# Patient Record
Sex: FEMALE | Race: BLACK OR AFRICAN AMERICAN | NOT HISPANIC OR LATINO | URBAN - METROPOLITAN AREA
[De-identification: names, ages, dates, MRNs, and addresses within clinical notes are randomized per-mention and may not be internally consistent; named-entity substitution may affect disease eponyms.]

---

## 2017-08-14 ENCOUNTER — HISTORICAL (OUTPATIENT)
Dept: ADMINISTRATIVE | Facility: HOSPITAL | Age: 67
End: 2017-08-14

## 2017-12-13 ENCOUNTER — HISTORICAL (OUTPATIENT)
Dept: RADIOLOGY | Facility: HOSPITAL | Age: 67
End: 2017-12-13

## 2019-04-11 ENCOUNTER — HISTORICAL (OUTPATIENT)
Dept: ADMINISTRATIVE | Facility: HOSPITAL | Age: 69
End: 2019-04-11

## 2019-04-11 LAB
ABS NEUT (OLG): 6.11 X10(3)/MCL (ref 2.1–9.2)
BASOPHILS # BLD AUTO: 0.04 X10(3)/MCL (ref 0–0.2)
BASOPHILS NFR BLD AUTO: 0.5 % (ref 0–1)
BUN SERPL-MCNC: 23 MG/DL (ref 7–18)
CALCIUM SERPL-MCNC: 9.3 MG/DL (ref 8.5–10.1)
CHLORIDE SERPL-SCNC: 107 MMOL/L (ref 98–107)
CO2 SERPL-SCNC: 27 MMOL/L (ref 21–32)
CREAT SERPL-MCNC: 1.05 MG/DL (ref 0.55–1.02)
CREAT/UREA NIT SERPL: 22
EOSINOPHIL # BLD AUTO: 0.05 X10(3)/MCL (ref 0–0.9)
EOSINOPHIL NFR BLD AUTO: 0.6 % (ref 0–6.4)
ERYTHROCYTE [DISTWIDTH] IN BLOOD BY AUTOMATED COUNT: 16.7 % (ref 11.5–17)
GLUCOSE SERPL-MCNC: 90 MG/DL (ref 74–106)
HCT VFR BLD AUTO: 36.5 % (ref 37–47)
HGB BLD-MCNC: 11.5 GM/DL (ref 12–16)
IMM GRANULOCYTES # BLD AUTO: 0.04 10*3/UL (ref 0–0.02)
IMM GRANULOCYTES NFR BLD AUTO: 0.5 % (ref 0–0.43)
LYMPHOCYTES # BLD AUTO: 1.4 X10(3)/MCL (ref 0.6–4.6)
LYMPHOCYTES NFR BLD AUTO: 17.1 % (ref 16–44)
MCH RBC QN AUTO: 28 PG (ref 27–31)
MCHC RBC AUTO-ENTMCNC: 31.5 GM/DL (ref 33–36)
MCV RBC AUTO: 89 FL (ref 80–94)
MONOCYTES # BLD AUTO: 0.56 X10(3)/MCL (ref 0.1–1.3)
MONOCYTES NFR BLD AUTO: 6.8 % (ref 4–12.1)
NEUTROPHILS # BLD AUTO: 6.11 X10(3)/MCL (ref 2.1–9.2)
NEUTROPHILS NFR BLD AUTO: 74.5 % (ref 43–73)
NRBC BLD AUTO-RTO: 0 % (ref 0–0.2)
PLATELET # BLD AUTO: 444 X10(3)/MCL (ref 130–400)
PMV BLD AUTO: 9.1 FL (ref 7.4–10.4)
POTASSIUM SERPL-SCNC: 4.2 MMOL/L (ref 3.5–5.1)
RBC # BLD AUTO: 4.1 X10(6)/MCL (ref 4.2–5.4)
SODIUM SERPL-SCNC: 141 MMOL/L (ref 136–145)
WBC # SPEC AUTO: 8.2 X10(3)/MCL (ref 4.5–11.5)

## 2019-04-16 ENCOUNTER — HOSPITAL ENCOUNTER (OUTPATIENT)
Dept: ORTHOPEDICS | Facility: HOSPITAL | Age: 69
End: 2019-04-17
Attending: ORTHOPAEDIC SURGERY | Admitting: ORTHOPAEDIC SURGERY

## 2019-10-08 ENCOUNTER — HISTORICAL (OUTPATIENT)
Dept: ADMINISTRATIVE | Facility: HOSPITAL | Age: 69
End: 2019-10-08

## 2019-10-31 ENCOUNTER — HISTORICAL (OUTPATIENT)
Dept: ADMINISTRATIVE | Facility: HOSPITAL | Age: 69
End: 2019-10-31

## 2019-10-31 LAB
ABS NEUT (OLG): 7.97 X10(3)/MCL (ref 2.1–9.2)
ALBUMIN SERPL-MCNC: 2.7 GM/DL (ref 3.4–5)
ALBUMIN/GLOB SERPL: 0.5 RATIO (ref 1–2)
ALP SERPL-CCNC: 40 UNIT/L (ref 45–117)
ALT SERPL-CCNC: 10 UNIT/L (ref 13–56)
AST SERPL-CCNC: 14 UNIT/L (ref 15–37)
BILIRUB SERPL-MCNC: 0.2 MG/DL (ref 0.2–1)
BILIRUBIN DIRECT+TOT PNL SERPL-MCNC: <0.1 MG/DL (ref 0–0.2)
BILIRUBIN DIRECT+TOT PNL SERPL-MCNC: >0.1 MG/DL (ref 0–1)
BUN SERPL-MCNC: 27 MG/DL (ref 7–18)
CALCIUM SERPL-MCNC: 9.7 MG/DL (ref 8.5–10.1)
CHLORIDE SERPL-SCNC: 107 MMOL/L (ref 98–107)
CO2 SERPL-SCNC: 27 MMOL/L (ref 21–32)
CREAT SERPL-MCNC: 1.01 MG/DL (ref 0.55–1.02)
ERYTHROCYTE [DISTWIDTH] IN BLOOD BY AUTOMATED COUNT: 15.8 % (ref 11.5–17)
GLOBULIN SER-MCNC: 5 GM/DL (ref 2–4)
GLUCOSE SERPL-MCNC: 99 MG/DL (ref 74–106)
HCT VFR BLD AUTO: 30.7 % (ref 37–47)
HGB BLD-MCNC: 9.6 GM/DL (ref 12–16)
MCH RBC QN AUTO: 26.3 PG (ref 27–31)
MCHC RBC AUTO-ENTMCNC: 31.3 GM/DL (ref 33–36)
MCV RBC AUTO: 84.1 FL (ref 80–94)
MRSA SCREEN BY PCR: NEGATIVE
NRBC BLD AUTO-RTO: 0 % (ref 0–0.2)
PLATELET # BLD AUTO: 675 X10(3)/MCL (ref 130–400)
PMV BLD AUTO: 8.5 FL (ref 7.4–10.4)
POTASSIUM SERPL-SCNC: 5.1 MMOL/L (ref 3.5–5.1)
PROT SERPL-MCNC: 7.7 GM/DL (ref 6.4–8.2)
RBC # BLD AUTO: 3.65 X10(6)/MCL (ref 4.2–5.4)
SODIUM SERPL-SCNC: 138 MMOL/L (ref 136–145)
WBC # SPEC AUTO: 10.6 X10(3)/MCL (ref 4.5–11.5)

## 2019-11-18 ENCOUNTER — HISTORICAL (OUTPATIENT)
Dept: SURGERY | Facility: HOSPITAL | Age: 69
End: 2019-11-18

## 2022-04-01 ENCOUNTER — HISTORICAL (OUTPATIENT)
Dept: HEPATOLOGY | Facility: HOSPITAL | Age: 72
End: 2022-04-01

## 2022-04-10 ENCOUNTER — HISTORICAL (OUTPATIENT)
Dept: ADMINISTRATIVE | Facility: HOSPITAL | Age: 72
End: 2022-04-10

## 2022-04-26 VITALS
WEIGHT: 171.06 LBS | DIASTOLIC BLOOD PRESSURE: 67 MMHG | BODY MASS INDEX: 26.85 KG/M2 | HEIGHT: 67 IN | SYSTOLIC BLOOD PRESSURE: 113 MMHG

## 2022-04-30 NOTE — OP NOTE
DATE OF SURGERY:    04/16/2019    SURGEON:  Dmitry Gray MD    SERVICE:  Orthopedics.    PREOPERATIVE DIAGNOSIS:  Locked and unreducible right middle finger, trigger finger.    POSTOPERATIVE DIAGNOSIS:  Locked and unreducible right middle finger, trigger finger.    PROCEDURE:  Trigger finger release, right middle finger.    ANESTHESIA:  IV sedation.    IV FLUIDS:  As per Anesthesia.    ESTIMATED BLOOD LOSS:  Less than 5 cc.    COUNTS:  Correct.    COMPLICATIONS:  None.    DISPOSITION:  Stable to PACU.    INDICATIONS FOR PROCEDURE:  Ms. Pina is a 68-year-old female who has been followed in my clinic.  The patient had complaints of a locked middle finger for the last 3 weeks.  She states it was popping and now she was not able to unlock it.  This has been going on for the last few weeks.  She does have a history of trigger finger.  She has failed conservative treatment.  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  She has a history of rheumatoid arthritis.  The risks, benefits and alternatives were discussed with the patient in detail.  The risks included, but were not limited to, pain, bleeding, infection, damage to blood vessels or nerves, heart attack, stroke, death, need for operation, continued pain, continued loss of motion, need for additional surgery, scar tissue.  The patient understood these risks and wanted to proceed with surgical intervention.  Informed consent was obtained.    PROCEDURE IN DETAIL:  The patient was found in preoperative holding by Anesthesia and found fit for surgery. She was taken to the operating room and placed on the operating room table in supine position. All bony prominences were well padded.  Timeout was called to identify correct patient, correct procedure and correct site, and all were in agreement.  The patient underwent IV sedation.  She was then prepped and draped in normal sterile fashion leaving the right upper extremity  exposed for surgery. Well-padded tourniquet was placed on the right upper arm.  Approximate tourniquet time was 5 minutes at 275.  She received her preoperative antibiotics.  After exsanguination of the right upper extremity, tourniquet was inflated. Attention was turned to the right middle finger where it was completely in the palm of her hand.  She had a complete flexed MCP joint.  She also had a rigid deformity, boutonniere defomrity with hyperextension of the DIP joint.  Next a 1.5 cm incision made over the A1 pulley of the right middle finger.  Soft tissue dissection was carried down where the A1 pulley was then incised.  A large amount of fluid was also released at this time.  Copious amounts of scar tissue was also released off the flexor tendon both proximally and distally.  After this was done, the patient had near full extension of her MCP joint.  She remained to have a rigid boutonniere deformity though she was now able to have flexion and extension of her MCP joint.  Her finger was unlocked.  There was no additional triggering or catching appreciated.  Tourniquet was released, hemostasis achieved, copious irrigation was used to wash the wound.  Incision was closed with 3-0 nylon suture in standard fashion.  Xeroform, 4 x 4's, soft tissue dressing and a well-padded volar splint was placed on the right hand and wrist.  She was then awoken by Anesthesia and brought to PACU in stable condition.        ______________________________  MD BUZZ Hutson/  DD:  04/16/2019  Time:  08:35PM  DT:  04/16/2019  Time:  08:58PM  Job #:  [id number]

## 2022-05-03 NOTE — HISTORICAL [facility] CERNER
This is a historical note converted from [facility]. Formatting and pictures may have been removed.  Please reference [facility] for original formatting and attached multimedia. Chief Complaint  8 WEEK FOLLOW UP LEFT HUMERUS SHAFT FX, S/0 ORIF  History of Present Illness  Pt now 9 weeks s/p left humerus shaft ORIF. She is here today for x-rays/evaluation. She is doing well in regard to left arm, but she c/o severe pain to both knees. She sees Dr. Stoddard (rheumatology) for severe RA. She states the knee pain prevents her from being able to walk with PT. She states the pain is so severe that she screams in agony when she is made to stand up with PT. She is requesting that we write an order that she should not have to stand up with PT.  Review of Systems  ????Constitutional: Negative.  ????Eye: Negative.  ????Ear/Nose/Mouth/Throat: Negative.  ????Respiratory: Negative.  ????Cardiovascular: Negative.  ????Gastrointestinal: Negative.  ????Genitourinary: Negative.  ????Hematology/Lymphatics: Negative except HPI.  ????Endocrine: Negative.  ????Immunologic: Negative except HPI.  ????Musculoskeletal: Negative except HPI.  ????Integumentary: Negative.  ????Neurologic: Negative.  ????Psychiatric: Negative.  ????All other systems are negative  Physical Exam  Vitals & Measurements  HR:?85?(Peripheral)? BP:?95/78? HT:?170?cm? HT:?170?cm? WT:?96?kg? WT:?96?kg? BMI:?33.22?  ????General-Alert, oriented, no fever, chills  ????HEENT-Normocephalic, EOMI, moist oral mucosa, neck supple and nontender  ????Respiratory-Nonlabored respirations, symmetric chest expansion, chest wall nontender  ????Cardiac-Regular rate and rhythm, Pulses palpable in all extremities, Normal peripheral perfusion  ????Gastrointestinal-Soft, nontender, nondistended  ????Hemo/Lymph-No lymphadenopathy  ????Tftnudbifstlziy-QNV-mdolvjgn well healed. ROM?elbow full?flexion, lacking 20 extension. Good?pronation/supination. NVID.  BLE-knees with extreme stiffness and pain on  attempted ROM, unable to straighten knees.  ????Neurologic-Alert and oriented x4, cooperative  ????Dermatologic-Skin warm, pink, dry.  Assessment/Plan  Closed displaced spiral fracture of shaft of left humerus  ?  [facility] She is now at baseline for ROM left elbow due to her hx RA. She can RTC 2 months for repeat x-ray/evaluation. We will internally refer her to our partner Dr. Drew to discuss knee replacement surgeries due to her severe knee arthritis. Continue PT to [facility].  ?  Ordered:  Clinic Follow up, *Est. 10/14/17 3:00:00 CDT, Order for future visit, Closed displaced spiral fracture of shaft of left humerus, [facility]  Post-Op follow-up visit [id number] PC, Closed displaced spiral fracture of shaft of left humerus, [facility], 08/14/17 15:36:00 CDT  XR Humerus Left 2 Views, Routine, *Est. 10/14/17 3:00:00 CDT, Follow Up Trauma, None, Ambulatory, Rad Type, Order for future visit, Closed displaced spiral fracture of shaft of left humerus, Not Scheduled, 2, month(s), In Approximately, *Est. 10/14/17 3:00:00 CDT  ?   Problem List/Past Medical History  Closed displaced spiral fracture of shaft of left humerus  Obesity  Historical  chronic back pain  depression  frequent H/A  high cholesterol  HTN  lumbar fracture  Right Ankle fracture  ulcer dx  visual disorder  Procedure/Surgical History  Transfusion of Nonautologous Red Blood Cells into Peripheral Vein, Percutaneous Approach (06/12/2017), ORIF Humerus Shaft (Left) (06/08/2017), Reposition Left Humeral Shaft with Internal Fixation Device, Open Approach (06/08/2017), Other endoscopy of small intestine (06/29/2013), Transfusion of packed cells (06/29/2013), lumbar fusion, Right Ankle fracture repair, Right Ovary removal.  Medications  CARAFATE 1 GM/10 ML SUSP, 1 gm, 10 mL, Oral, Daily  CLOPIDOGREL 75 MG TABLET, 75 mg, 1 tab(s), Oral, Daily  Colace 100 mg oral capsule, 100 mg, 1 cap(s), Oral, Daily  fenofibric acid 135 mg oral  delayed release capsule, 135 mg, 1 cap(s), Oral, Daily  furosemide 20 mg oral tablet, 1 cap(s), Oral, Once  gabapentin 300 mg oral capsule, 300 mg, Oral, TID  ISOSORBIDE MN ER 60 MG TABLET, 60 mg, 1 tab(s), Oral, Daily  metoprolol succinate 25 mg oral tablet, extended release, 25 mg, 1 tab(s), Oral, BID  Nitrostat 0.4 mg sublingual TAB, 0.4 mg, 1 tab(s), SL, q5min, PRN  omeprazole 40 mg oral DR capsule (pt. own), 40 mg, 1 cap(s), Oral, Daily  RANEXA ER 1,000 MG TABLET, 1000 mg, 1 tab(s), Oral, BID  ROSUVASTATIN CALCIUM 10 MG TAB, 10 mg, 1 tab(s), Oral, Daily  spironolactone 25 mg oral tablet, 12.5 mg, 0.5 tab(s), Oral, BID  Vitamin D, 1000iu, Oral, Daily  Allergies  No Known Allergies  Social History  Alcohol - Denies Alcohol Use  Substance Abuse - Denies Substance Abuse  Tobacco - Denies Tobacco Use  Never smoker  Diagnostic Results  X-ray left humerus shows anatomic alignment, evidence of further consolidation noted, intact hardware.  X-ray knees shows severe arthritis, joint space narrowing, osteochondral sclerosis.      Patient evaluated and discussed with Akil Yen NP. I agree with his assessment and plan of care with any exceptions or additions noted. She is doing well with regards to her left upper extremity today however she is crying in pain?due to his of?rheumatoid arthritis?which is affecting bilateral knees?worse on the left.?She states that she is unable to stand and walk?and has difficult time just pain with physical therapy.?She recently had injections performed of both of her knees?she states that this gives her relief for approximately 1 week but no more. I have discussed the fact that she will need to fully recover from her?left upper extremity surgery before she could be considered a candidate for?total knee replacement. I would like to have her evaluated by one of my partners who performs joint arthroplasties?to discuss the possibility?of this in the future for her.?She is grateful for  the referral?and understands and agrees with this plan today

## 2022-05-12 DIAGNOSIS — M54.16 LUMBAR RADICULOPATHY: ICD-10-CM

## 2022-05-12 DIAGNOSIS — M54.12 CERVICAL RADICULOPATHY: Primary | ICD-10-CM

## 2022-06-01 ENCOUNTER — HOSPITAL ENCOUNTER (OUTPATIENT)
Dept: RADIOLOGY | Facility: HOSPITAL | Age: 72
Discharge: HOME | End: 2022-06-01
Attending: PAIN MEDICINE
Payer: MEDICARE

## 2022-06-01 DIAGNOSIS — M54.16 LUMBAR RADICULOPATHY: ICD-10-CM

## 2022-06-01 PROCEDURE — A9577 INJ MULTIHANCE: HCPCS | Performed by: PAIN MEDICINE

## 2022-06-01 PROCEDURE — 25500020 PHARM REV CODE 255: Performed by: PAIN MEDICINE

## 2022-06-01 PROCEDURE — 72158 MRI LUMBAR SPINE W/O & W/DYE: CPT | Mod: TC

## 2022-06-01 RX ADMIN — GADOBENATE DIMEGLUMINE 20 ML: 529 INJECTION, SOLUTION INTRAVENOUS at 05:06

## 2022-06-15 ENCOUNTER — HISTORICAL (OUTPATIENT)
Dept: ADMINISTRATIVE | Facility: HOSPITAL | Age: 72
End: 2022-06-15

## 2022-06-16 ENCOUNTER — HISTORICAL (OUTPATIENT)
Dept: ADMINISTRATIVE | Facility: HOSPITAL | Age: 72
End: 2022-06-16

## 2022-06-17 ENCOUNTER — HISTORICAL (OUTPATIENT)
Dept: ADMINISTRATIVE | Facility: HOSPITAL | Age: 72
End: 2022-06-17

## 2022-06-18 ENCOUNTER — HISTORICAL (OUTPATIENT)
Dept: ADMINISTRATIVE | Facility: HOSPITAL | Age: 72
End: 2022-06-18

## 2022-06-20 ENCOUNTER — HISTORICAL (OUTPATIENT)
Dept: ADMINISTRATIVE | Facility: HOSPITAL | Age: 72
End: 2022-06-20

## 2022-06-21 ENCOUNTER — HISTORICAL (OUTPATIENT)
Dept: ADMINISTRATIVE | Facility: HOSPITAL | Age: 72
End: 2022-06-21

## 2022-06-22 ENCOUNTER — HISTORICAL (OUTPATIENT)
Dept: ADMINISTRATIVE | Facility: HOSPITAL | Age: 72
End: 2022-06-22

## 2022-06-23 ENCOUNTER — HISTORICAL (OUTPATIENT)
Dept: ADMINISTRATIVE | Facility: HOSPITAL | Age: 72
End: 2022-06-23

## 2022-06-24 ENCOUNTER — HISTORICAL (OUTPATIENT)
Dept: ADMINISTRATIVE | Facility: HOSPITAL | Age: 72
End: 2022-06-24

## 2022-06-25 ENCOUNTER — HISTORICAL (OUTPATIENT)
Dept: ADMINISTRATIVE | Facility: HOSPITAL | Age: 72
End: 2022-06-25

## 2022-06-26 ENCOUNTER — HISTORICAL (OUTPATIENT)
Dept: ADMINISTRATIVE | Facility: HOSPITAL | Age: 72
End: 2022-06-26

## 2022-06-27 ENCOUNTER — HISTORICAL (OUTPATIENT)
Dept: ADMINISTRATIVE | Facility: HOSPITAL | Age: 72
End: 2022-06-27

## 2022-06-28 ENCOUNTER — HISTORICAL (OUTPATIENT)
Dept: ADMINISTRATIVE | Facility: HOSPITAL | Age: 72
End: 2022-06-28

## 2022-06-29 ENCOUNTER — HISTORICAL (OUTPATIENT)
Dept: ADMINISTRATIVE | Facility: HOSPITAL | Age: 72
End: 2022-06-29